# Patient Record
Sex: MALE | Race: WHITE | NOT HISPANIC OR LATINO | Employment: FULL TIME | URBAN - METROPOLITAN AREA
[De-identification: names, ages, dates, MRNs, and addresses within clinical notes are randomized per-mention and may not be internally consistent; named-entity substitution may affect disease eponyms.]

---

## 2023-08-11 ENCOUNTER — HOSPITAL ENCOUNTER (OUTPATIENT)
Dept: DATA CONVERSION | Facility: HOSPITAL | Age: 58
Discharge: HOME | End: 2023-08-11

## 2023-08-11 DIAGNOSIS — I10 ESSENTIAL (PRIMARY) HYPERTENSION: ICD-10-CM

## 2023-08-11 DIAGNOSIS — R53.81 OTHER MALAISE: ICD-10-CM

## 2023-08-11 DIAGNOSIS — M25.512 PAIN IN LEFT SHOULDER: ICD-10-CM

## 2023-08-11 DIAGNOSIS — R23.2 FLUSHING: ICD-10-CM

## 2023-08-11 LAB
ALBUMIN SERPL-MCNC: 4.6 GM/DL (ref 3.5–5)
ALBUMIN/GLOB SERPL: 1.6 RATIO (ref 1.5–3)
ALP BLD-CCNC: 63 U/L (ref 35–125)
ALT SERPL-CCNC: 29 U/L (ref 5–40)
ANION GAP SERPL CALCULATED.3IONS-SCNC: 14 MMOL/L (ref 0–19)
AST SERPL-CCNC: 23 U/L (ref 5–40)
BACTERIA UR QL AUTO: NEGATIVE
BASOPHILS # BLD AUTO: 0.01 K/UL (ref 0–0.22)
BASOPHILS NFR BLD AUTO: 0.1 % (ref 0–1)
BILIRUB SERPL-MCNC: 0.2 MG/DL (ref 0.1–1.2)
BILIRUB UR QL STRIP.AUTO: NEGATIVE
BUN SERPL-MCNC: 29 MG/DL (ref 8–25)
BUN/CREAT SERPL: 20.7 RATIO (ref 8–21)
CALCIUM SERPL-MCNC: 9.6 MG/DL (ref 8.5–10.4)
CHLORIDE SERPL-SCNC: 100 MMOL/L (ref 97–107)
CLARITY UR: CLEAR
CO2 SERPL-SCNC: 24 MMOL/L (ref 24–31)
COLOR UR: COLORLESS
CREAT SERPL-MCNC: 1.4 MG/DL (ref 0.4–1.6)
DEPRECATED RDW RBC AUTO: 43.6 FL (ref 37–54)
DIFFERENTIAL METHOD BLD: ABNORMAL
EOSINOPHIL # BLD AUTO: 0.07 K/UL (ref 0–0.45)
EOSINOPHIL NFR BLD: 0.8 % (ref 0–3)
ERYTHROCYTE [DISTWIDTH] IN BLOOD BY AUTOMATED COUNT: 13.2 % (ref 11.7–15)
GFR SERPL CREATININE-BSD FRML MDRD: 59 ML/MIN/1.73 M2
GLOBULIN SER-MCNC: 2.9 G/DL (ref 1.9–3.7)
GLUCOSE SERPL-MCNC: 197 MG/DL (ref 65–99)
GLUCOSE UR STRIP.AUTO-MCNC: 300 MG/DL
HCT VFR BLD AUTO: 43.8 % (ref 41–50)
HGB BLD-MCNC: 15.3 GM/DL (ref 13.5–16.5)
HGB UR QL STRIP.AUTO: ABNORMAL /HPF (ref 0–3)
HGB UR QL: ABNORMAL
HYALINE CASTS UR QL AUTO: ABNORMAL /LPF
IMM GRANULOCYTES # BLD AUTO: 0.06 K/UL (ref 0–0.1)
KETONES UR QL STRIP.AUTO: NEGATIVE
LEUKOCYTE ESTERASE UR QL STRIP.AUTO: NEGATIVE
LYMPHOCYTES # BLD AUTO: 1.86 K/UL (ref 1.2–3.2)
LYMPHOCYTES NFR BLD MANUAL: 20.4 % (ref 20–40)
MCH RBC QN AUTO: 31.5 PG (ref 26–34)
MCHC RBC AUTO-ENTMCNC: 34.9 % (ref 31–37)
MCV RBC AUTO: 90.3 FL (ref 80–100)
MICROSCOPIC (UA): ABNORMAL
MONOCYTES # BLD AUTO: 0.42 K/UL (ref 0–0.8)
MONOCYTES NFR BLD MANUAL: 4.6 % (ref 0–8)
NEUTROPHILS # BLD AUTO: 6.71 K/UL
NEUTROPHILS # BLD AUTO: 6.71 K/UL (ref 1.8–7.7)
NEUTROPHILS.IMMATURE NFR BLD: 0.7 % (ref 0–1)
NEUTS SEG NFR BLD: 73.4 % (ref 50–70)
NITRITE UR QL STRIP.AUTO: NEGATIVE
NRBC BLD-RTO: 0 /100 WBC
NT-PROBNP SERPL-MCNC: 228 PG/ML (ref 0–177)
PH UR STRIP.AUTO: 6 [PH] (ref 4.6–8)
PLATELET # BLD AUTO: 184 K/UL (ref 150–450)
PMV BLD AUTO: 10.7 CU (ref 7–12.6)
POTASSIUM SERPL-SCNC: 4.2 MMOL/L (ref 3.4–5.1)
PROT SERPL-MCNC: 7.5 G/DL (ref 5.9–7.9)
PROT UR STRIP.AUTO-MCNC: 200 MG/DL
RBC # BLD AUTO: 4.85 M/UL (ref 4.5–5.5)
SODIUM SERPL-SCNC: 138 MMOL/L (ref 133–145)
SP GR UR STRIP.AUTO: 1.01 (ref 1–1.03)
SQUAMOUS UR QL AUTO: ABNORMAL /HPF
URINE CULTURE: ABNORMAL
UROBILINOGEN UR QL STRIP.AUTO: NORMAL MG/DL (ref 0–1)
WBC # BLD AUTO: 9.1 K/UL (ref 4.5–11)
WBC #/AREA URNS AUTO: ABNORMAL /HPF (ref 0–3)

## 2023-11-02 PROBLEM — M17.11 PRIMARY OSTEOARTHRITIS OF RIGHT KNEE: Status: ACTIVE | Noted: 2023-11-02

## 2023-11-02 PROBLEM — I10 HYPERTENSIVE DISORDER: Status: ACTIVE | Noted: 2023-11-02

## 2023-11-02 PROBLEM — S83.209A TEAR OF MENISCUS, CURRENT: Status: ACTIVE | Noted: 2023-11-02

## 2023-11-02 RX ORDER — CARVEDILOL 12.5 MG/1
12.5 TABLET ORAL 2 TIMES DAILY
COMMUNITY
Start: 2023-10-10

## 2023-11-02 RX ORDER — FENOFIBRATE 160 MG/1
TABLET ORAL
COMMUNITY
Start: 2016-05-27

## 2023-11-02 RX ORDER — OXYCODONE AND ACETAMINOPHEN 5; 325 MG/1; MG/1
1 TABLET ORAL EVERY 6 HOURS PRN
COMMUNITY
Start: 2016-03-15

## 2023-11-02 RX ORDER — ONDANSETRON 4 MG/1
4 TABLET, ORALLY DISINTEGRATING ORAL EVERY 8 HOURS PRN
COMMUNITY
Start: 2020-06-23

## 2023-11-02 RX ORDER — MELOXICAM 15 MG/1
TABLET ORAL
COMMUNITY
Start: 2016-05-27

## 2023-11-02 RX ORDER — ALLOPURINOL 100 MG/1
200 TABLET ORAL
COMMUNITY
Start: 2016-06-06

## 2023-11-02 RX ORDER — LISINOPRIL 20 MG/1
20 TABLET ORAL 2 TIMES DAILY
COMMUNITY
Start: 2023-08-15

## 2023-11-02 RX ORDER — ATORVASTATIN CALCIUM 10 MG/1
1 TABLET, FILM COATED ORAL DAILY
COMMUNITY
Start: 2016-06-06

## 2023-11-02 RX ORDER — FENOFIBRATE 145 MG/1
145 TABLET, FILM COATED ORAL
COMMUNITY
Start: 2023-08-15

## 2023-11-02 RX ORDER — METFORMIN HYDROCHLORIDE 500 MG/1
500 TABLET ORAL 2 TIMES DAILY
COMMUNITY
Start: 2016-06-12

## 2023-11-02 RX ORDER — MOMETASONE FUROATE 50 UG/1
2 SPRAY, METERED NASAL NIGHTLY
COMMUNITY
Start: 2023-07-18

## 2023-11-02 RX ORDER — METHOCARBAMOL 500 MG/1
2 TABLET, FILM COATED ORAL NIGHTLY
COMMUNITY
Start: 2023-08-07

## 2023-11-02 RX ORDER — LISINOPRIL AND HYDROCHLOROTHIAZIDE 20; 25 MG/1; MG/1
TABLET ORAL
COMMUNITY
Start: 2016-06-19

## 2023-11-03 ENCOUNTER — OFFICE VISIT (OUTPATIENT)
Dept: ORTHOPEDIC SURGERY | Facility: HOSPITAL | Age: 58
End: 2023-11-03
Payer: COMMERCIAL

## 2023-11-03 DIAGNOSIS — M17.11 ARTHRITIS OF RIGHT KNEE: Primary | ICD-10-CM

## 2023-11-03 DIAGNOSIS — Z98.890 HISTORY OF ARTHROSCOPY OF RIGHT KNEE: ICD-10-CM

## 2023-11-03 PROCEDURE — 99213 OFFICE O/P EST LOW 20 MIN: CPT | Performed by: FAMILY MEDICINE

## 2023-11-03 PROCEDURE — G0463 HOSPITAL OUTPT CLINIC VISIT: HCPCS | Performed by: FAMILY MEDICINE

## 2023-11-03 NOTE — PROGRESS NOTES
** Please excuse any errors in grammar or translation related to this dictation. Voice recognition software was utilized to prepare this document. **    Assessment & Plan:  Patient follow-up for right knee pain secondary to arthritis.  Last series of Hyalgan injections provided approximately 6 months of relief.  Pain has been increasing as he is returned to more normal activities at work which require a lot of squatting and kneeling.  He would like to have another round of Hyalgan injections completed which I feel is appropriate for him at this time.  We will start the prior authorization process through his Work[employer]'s [employer]. for this.  Patient will follow-up once approved likely.  Can utilize OTC analgesics such as Tylenol or ibuprofen as well as application ice or heating pad to mitigate symptoms.  All questions answered and patient responded care.    Chief complaint:  Right knee pain, [facility]    HPI:  11/3/23: Patient reports the last Hyalgan series injection provided 5-6 months relief.  He did not return to full duty at work until August due to a shoulder surgery and noticed an increase of pain at that time.  Denies any new injuries.      1/31/23:Here for Hyalgan injection #5 of 5. No changes in health status since previous visit.      1/24/23:Here for Hyalgan injection #4 of 5. No changes in health status since previous visit.      1/17/23:Here for Hyalgan injection #3 of 5. No changes in health status since previous visit.      1/10/23:Here for Hyalgan injection #2 of 5. No changes in health status since previous visit. Started back to work this past week.      1/4/23: Here for Hyalgan injection #1 of 5. No changes in health status since previous visit. Plans to return to work following shoulder surgery next week.     12/20/22: 56 y/o M with chronic R knee pain. Initial injury occurred when unloading dairy pallet at grocery store in 2015. Pallet started to slide and attempted to hold onto it which caused knee to  twist and buckle. MRI demonstrated meniscal tear and had arthroscopy in 2015 by Dr. Gutierrez. Following surgery, continued to have ongoing pain. Has been managed with recurrent hyaluronic acid injections since 2017. These injections provided approximately 6 months of relief. Last had Hyalgan series completed in January 2022 by Dr. Kang. Delayed in having repeated due to recent L rotator cuff surgery.      Exam:  Right knee examined. No effusion, ecchymosis, or erythema.  AROM from 5 to 120 deg with 5/5 strength. SILT overlying knee. Motion crepitus present. Tenderness along medial joint line and medial femoral condyle.  No popliteal mass palpated. Negative anterior and posterior drawer.  No laxity to varus or valgus stress at 0 or 30 deg.  No patellar apprehension.

## 2023-12-15 ENCOUNTER — OFFICE VISIT (OUTPATIENT)
Dept: ORTHOPEDIC SURGERY | Facility: HOSPITAL | Age: 58
End: 2023-12-15
Payer: COMMERCIAL

## 2023-12-15 DIAGNOSIS — M17.11 ARTHRITIS OF RIGHT KNEE: Primary | ICD-10-CM

## 2023-12-15 PROCEDURE — 2500000004 HC RX 250 GENERAL PHARMACY W/ HCPCS (ALT 636 FOR OP/ED): Mod: JZ | Performed by: FAMILY MEDICINE

## 2023-12-15 PROCEDURE — 99213 OFFICE O/P EST LOW 20 MIN: CPT | Performed by: FAMILY MEDICINE

## 2023-12-15 PROCEDURE — 2500000005 HC RX 250 GENERAL PHARMACY W/O HCPCS: Performed by: FAMILY MEDICINE

## 2023-12-15 PROCEDURE — G0463 HOSPITAL OUTPT CLINIC VISIT: HCPCS | Performed by: FAMILY MEDICINE

## 2023-12-15 PROCEDURE — 20611 DRAIN/INJ JOINT/BURSA W/US: CPT | Performed by: FAMILY MEDICINE

## 2023-12-15 RX ORDER — LIDOCAINE HYDROCHLORIDE 10 MG/ML
2 INJECTION INFILTRATION; PERINEURAL
Status: COMPLETED | OUTPATIENT
Start: 2023-12-15 | End: 2023-12-15

## 2023-12-15 RX ORDER — DAPAGLIFLOZIN 10 MG/1
1 TABLET, FILM COATED ORAL
COMMUNITY
Start: 2023-11-10 | End: 2024-02-08

## 2023-12-15 RX ADMIN — Medication 20 MG: at 08:31

## 2023-12-15 RX ADMIN — LIDOCAINE HYDROCHLORIDE 2 ML: 10 INJECTION, SOLUTION INFILTRATION; PERINEURAL at 08:31

## 2023-12-15 ASSESSMENT — PAIN SCALES - GENERAL: PAINLEVEL_OUTOF10: 4

## 2023-12-15 ASSESSMENT — PAIN - FUNCTIONAL ASSESSMENT: PAIN_FUNCTIONAL_ASSESSMENT: 0-10

## 2023-12-15 NOTE — PROGRESS NOTES
** Please excuse any errors in grammar or translation related to this dictation. Voice recognition software was utilized to prepare this document. **    Assessment & Plan:  Patient here for ongoing management of right knee arthritis. Hyalgan injection completed in right knee as below. Will f/u in 1 week for next injection of series.  Can use otc analgesics (ibuprofen, naproxen, acetaminophen) as able and apply ice pack to control symptoms in interim. All questions answered and patient is agreeable to this plan.      Chief complaint:  Right knee pain, C    HPI:    12/15/23: Patient follow-up for continued nonoperative management of his chronic right knee pain due to arthritis.  Last hyaluronic acid injection was completed in January 2023 which provided 6 months relief.  No new injuries reported.  He was approved to receive repeat viscosupplement injections is here today to resume these.    11/3/23: Patient reports the last Hyalgan series injection provided 5-6 months relief.  He did not return to full duty at work until August due to a shoulder surgery and noticed an increase of pain at that time.  Denies any new injuries.      1/31/23:Here for Hyalgan injection #5 of 5. No changes in health status since previous visit.      1/24/23:Here for Hyalgan injection #4 of 5. No changes in health status since previous visit.      1/17/23:Here for Hyalgan injection #3 of 5. No changes in health status since previous visit.      1/10/23:Here for Hyalgan injection #2 of 5. No changes in health status since previous visit. Started back to work this past week.      1/4/23: Here for Hyalgan injection #1 of 5. No changes in health status since previous visit. Plans to return to work following shoulder surgery next week.     12/20/22: 58 y/o M with chronic R knee pain. Initial injury occurred when unloading dairy pallet at grocery store in 2015. Pallet started to slide and attempted to hold onto it which caused knee to twist and  jermaine. MRI demonstrated meniscal tear and had arthroscopy in 2015 by Dr. Gutierrez. Following surgery, continued to have ongoing pain. Has been managed with recurrent hyaluronic acid injections since 2017. These injections provided approximately 6 months of relief. Last had Hyalgan series completed in January 2022 by Dr. Kang. Delayed in having repeated due to recent L rotator cuff surgery.      Exam:  Right knee examined. No effusion, ecchymosis, or erythema.  AROM from 5 to 120 deg with 5/5 strength.     Patient ID: Anthony V Lanese is a 57 y.o. male.    L Inj/Asp: R knee on 12/15/2023 8:31 AM  Indications: pain  Details: 21 G needle, ultrasound-guided superolateral approach  Medications: 2 mL lidocaine 10 mg/mL (1 %); 20 mg sodium hyaluronate 10 mg/mL  Outcome: tolerated well, no immediate complications  Procedure, treatment alternatives, risks and benefits explained, specific risks discussed. Consent was given by the patient. Immediately prior to procedure a time out was called to verify the correct patient, procedure, equipment, support staff and site/side marked as required. Patient was prepped and draped in the usual sterile fashion.

## 2023-12-21 ENCOUNTER — OFFICE VISIT (OUTPATIENT)
Dept: ORTHOPEDIC SURGERY | Facility: HOSPITAL | Age: 58
End: 2023-12-21
Payer: COMMERCIAL

## 2023-12-21 DIAGNOSIS — M17.11 ARTHRITIS OF RIGHT KNEE: Primary | ICD-10-CM

## 2023-12-21 PROCEDURE — 2500000005 HC RX 250 GENERAL PHARMACY W/O HCPCS: Performed by: FAMILY MEDICINE

## 2023-12-21 PROCEDURE — 20611 DRAIN/INJ JOINT/BURSA W/US: CPT | Mod: RT | Performed by: FAMILY MEDICINE

## 2023-12-21 PROCEDURE — 20611 DRAIN/INJ JOINT/BURSA W/US: CPT | Performed by: FAMILY MEDICINE

## 2023-12-21 PROCEDURE — 2500000004 HC RX 250 GENERAL PHARMACY W/ HCPCS (ALT 636 FOR OP/ED): Mod: JZ | Performed by: FAMILY MEDICINE

## 2023-12-21 RX ORDER — LIDOCAINE HYDROCHLORIDE 10 MG/ML
2 INJECTION INFILTRATION; PERINEURAL
Status: COMPLETED | OUTPATIENT
Start: 2023-12-21 | End: 2023-12-21

## 2023-12-21 RX ADMIN — LIDOCAINE HYDROCHLORIDE 2 ML: 10 INJECTION, SOLUTION INFILTRATION; PERINEURAL at 15:59

## 2023-12-21 RX ADMIN — Medication 20 MG: at 15:59

## 2023-12-21 NOTE — PROGRESS NOTES
** Please excuse any errors in grammar or translation related to this dictation. Voice recognition software was utilized to prepare this document. **    Assessment & Plan:  Patient here for ongoing management of right knee arthritis. Hyalgan injection completed in right knee as below. Will f/u in 1 week for next injection of series.  Can use otc analgesics (ibuprofen, naproxen, acetaminophen) as able and apply ice pack to control symptoms in interim. All questions answered and patient is agreeable to this plan.    Chief complaint:  Right knee pain, C    HPI:  12/21/23: Patient here for Hyalgan injection #2 of 5. No changes in health status since previous visit.      12/15/23: Patient follow-up for continued nonoperative management of his chronic right knee pain due to arthritis.  Last hyaluronic acid injection was completed in January 2023 which provided 6 months relief.  No new injuries reported.  He was approved to receive repeat viscosupplement injections is here today to resume these.    11/3/23: Patient reports the last Hyalgan series injection provided 5-6 months relief.  He did not return to full duty at work until August due to a shoulder surgery and noticed an increase of pain at that time.  Denies any new injuries.       1/4/23-1/31/23: Hyalgan injections x 5 completed weekly.      12/20/22: 56 y/o M with chronic R knee pain. Initial injury occurred when unloading dairy pallet at grocery store in 2015. Pallet started to slide and attempted to hold onto it which caused knee to twist and buckle. MRI demonstrated meniscal tear and had arthroscopy in 2015 by Dr. Gutierrez. Following surgery, continued to have ongoing pain. Has been managed with recurrent hyaluronic acid injections since 2017. These injections provided approximately 6 months of relief. Last had Hyalgan series completed in January 2022 by Dr. Kang. Delayed in having repeated due to recent L rotator cuff surgery.      Exam:  Right knee  examined. No effusion, ecchymosis, or erythema.  AROM from 5 to 120 deg with 5/5 strength.     Patient ID: Anthony V Lanese is a 58 y.o. male.    L Inj/Asp: R knee on 12/21/2023 3:59 PM  Indications: pain  Details: 21 G needle, ultrasound-guided superolateral approach  Medications: 20 mg sodium hyaluronate 10 mg/mL; 2 mL lidocaine 10 mg/mL (1 %)  Outcome: tolerated well, no immediate complications  Procedure, treatment alternatives, risks and benefits explained, specific risks discussed. Consent was given by the patient. Immediately prior to procedure a time out was called to verify the correct patient, procedure, equipment, support staff and site/side marked as required. Patient was prepped and draped in the usual sterile fashion.

## 2023-12-29 ENCOUNTER — OFFICE VISIT (OUTPATIENT)
Dept: ORTHOPEDIC SURGERY | Facility: HOSPITAL | Age: 58
End: 2023-12-29
Payer: COMMERCIAL

## 2023-12-29 DIAGNOSIS — M17.11 ARTHRITIS OF RIGHT KNEE: Primary | ICD-10-CM

## 2023-12-29 PROCEDURE — 20611 DRAIN/INJ JOINT/BURSA W/US: CPT | Performed by: FAMILY MEDICINE

## 2023-12-29 PROCEDURE — 2500000005 HC RX 250 GENERAL PHARMACY W/O HCPCS: Performed by: FAMILY MEDICINE

## 2023-12-29 PROCEDURE — 20611 DRAIN/INJ JOINT/BURSA W/US: CPT | Mod: RT | Performed by: FAMILY MEDICINE

## 2023-12-29 PROCEDURE — 2500000004 HC RX 250 GENERAL PHARMACY W/ HCPCS (ALT 636 FOR OP/ED): Mod: JZ | Performed by: FAMILY MEDICINE

## 2023-12-29 RX ORDER — LIDOCAINE HYDROCHLORIDE 10 MG/ML
2 INJECTION INFILTRATION; PERINEURAL
Status: COMPLETED | OUTPATIENT
Start: 2023-12-29 | End: 2023-12-29

## 2023-12-29 RX ADMIN — LIDOCAINE HYDROCHLORIDE 2 ML: 10 INJECTION, SOLUTION INFILTRATION; PERINEURAL at 10:09

## 2023-12-29 RX ADMIN — Medication 20 MG: at 10:09

## 2023-12-29 NOTE — PROGRESS NOTES
** Please excuse any errors in grammar or translation related to this dictation. Voice recognition software was utilized to prepare this document. **    Assessment & Plan:  Patient here for ongoing management of right knee arthritis. Hyalgan injection completed in right knee as below. Will f/u in 1 week for next injection of series.  Can use otc analgesics (ibuprofen, naproxen, acetaminophen) as able and apply ice pack to control symptoms in interim. All questions answered and patient is agreeable to this plan.    Chief complaint:  Right knee pain, [facility]    HPI:  12/29/23:  Patient here for Hyalgan injection #3 of 5. No changes in health status since previous visit.     12/21/23: Patient here for Hyalgan injection #2 of 5. No changes in health status since previous visit.      12/15/23: Patient follow-up for continued nonoperative management of his chronic right knee pain due to arthritis.  Last hyaluronic acid injection was completed in January 2023 which provided 6 months relief.  No new injuries reported.  He was approved to receive repeat viscosupplement injections is here today to resume these.    11/3/23: Patient reports the last Hyalgan series injection provided 5-6 months relief.  He did not return to full duty at work until August due to a shoulder surgery and noticed an increase of pain at that time.  Denies any new injuries.       1/4/23-1/31/23: Hyalgan injections x 5 completed weekly.      12/20/22: 58 y/o M with chronic R knee pain. Initial injury occurred when unloading dairy pallet at grocery store in 2015. Pallet started to slide and attempted to hold onto it which caused knee to twist and buckle. MRI demonstrated meniscal tear and had arthroscopy in 2015 by Dr. Gutierrez. Following surgery, continued to have ongoing pain. Has been managed with recurrent hyaluronic acid injections since 2017. These injections provided approximately 6 months of relief. Last had Hyalgan series completed in January 2022 by  Dr. Kang. Delayed in having repeated due to recent L rotator cuff surgery.      Exam:  Right knee examined. No effusion, ecchymosis, or erythema.  AROM from 5 to 120 deg with 5/5 strength.     Patient ID: Anthony V Lanese is a 58 y.o. male.    L Inj/Asp: R knee on 12/29/2023 10:09 AM  Indications: pain  Details: 21 G needle, ultrasound-guided superolateral approach  Medications: 20 mg sodium hyaluronate 10 mg/mL; 2 mL lidocaine 10 mg/mL (1 %)  Outcome: tolerated well, no immediate complications  Procedure, treatment alternatives, risks and benefits explained, specific risks discussed. Consent was given by the patient. Immediately prior to procedure a time out was called to verify the correct patient, procedure, equipment, support staff and site/side marked as required. Patient was prepped and draped in the usual sterile fashion.

## 2024-01-05 ENCOUNTER — OFFICE VISIT (OUTPATIENT)
Dept: ORTHOPEDIC SURGERY | Facility: HOSPITAL | Age: 59
End: 2024-01-05
Payer: COMMERCIAL

## 2024-01-05 DIAGNOSIS — M17.11 ARTHRITIS OF RIGHT KNEE: Primary | ICD-10-CM

## 2024-01-05 PROCEDURE — 2500000005 HC RX 250 GENERAL PHARMACY W/O HCPCS: Performed by: FAMILY MEDICINE

## 2024-01-05 PROCEDURE — 2500000004 HC RX 250 GENERAL PHARMACY W/ HCPCS (ALT 636 FOR OP/ED): Mod: JZ | Performed by: FAMILY MEDICINE

## 2024-01-05 PROCEDURE — 20611 DRAIN/INJ JOINT/BURSA W/US: CPT | Performed by: FAMILY MEDICINE

## 2024-01-05 PROCEDURE — 20611 DRAIN/INJ JOINT/BURSA W/US: CPT | Mod: RT | Performed by: FAMILY MEDICINE

## 2024-01-05 RX ORDER — LIDOCAINE HYDROCHLORIDE 10 MG/ML
2 INJECTION INFILTRATION; PERINEURAL
Status: COMPLETED | OUTPATIENT
Start: 2024-01-05 | End: 2024-01-05

## 2024-01-05 RX ADMIN — LIDOCAINE HYDROCHLORIDE 2 ML: 10 INJECTION, SOLUTION INFILTRATION; PERINEURAL at 09:34

## 2024-01-05 RX ADMIN — Medication 20 MG: at 09:34

## 2024-01-05 NOTE — PROGRESS NOTES
** Please excuse any errors in grammar or translation related to this dictation. Voice recognition software was utilized to prepare this document. **    Assessment & Plan:  Patient here for ongoing management of right knee arthritis. Hyalgan injection completed in right knee as below. Will f/u in 1 week for next injection of series.  Can use otc analgesics (ibuprofen, naproxen, acetaminophen) as able and apply ice pack to control symptoms in interim. All questions answered and patient is agreeable to this plan.    Chief complaint:  Right knee pain, [facility]    HPI:  1/5/23:  Patient here for Hyalgan injection #4 of 5.  No changes in health status since previous visit.     12/29/23:  Patient here for Hyalgan injection #3 of 5. No changes in health status since previous visit.     12/21/23: Patient here for Hyalgan injection #2 of 5. No changes in health status since previous visit.      12/15/23: Patient follow-up for continued nonoperative management of his chronic right knee pain due to arthritis.  Last hyaluronic acid injection was completed in January 2023 which provided 6 months relief.  No new injuries reported.  He was approved to receive repeat viscosupplement injections is here today to resume these.    11/3/23: Patient reports the last Hyalgan series injection provided 5-6 months relief.  He did not return to full duty at work until August due to a shoulder surgery and noticed an increase of pain at that time.  Denies any new injuries.       1/4/23-1/31/23: Hyalgan injections x 5 completed weekly.      12/20/22: 56 y/o M with chronic R knee pain. Initial injury occurred when unloading dairy pallet at grocery store in 2015. Pallet started to slide and attempted to hold onto it which caused knee to twist and buckle. MRI demonstrated meniscal tear and had arthroscopy in 2015 by Dr. Gutierrez. Following surgery, continued to have ongoing pain. Has been managed with recurrent hyaluronic acid injections since 2017. These  injections provided approximately 6 months of relief. Last had Hyalgan series completed in January 2022 by Dr. Kang. Delayed in having repeated due to recent L rotator cuff surgery.      Exam:  Right knee examined. No effusion, ecchymosis, or erythema.  AROM from 5 to 120 deg with 5/5 strength.     Patient ID: Anthony V Lanese is a 58 y.o. male.    L Inj/Asp: R knee on 1/5/2024 9:34 AM  Indications: pain  Details: 21 G needle, ultrasound-guided  Medications: 20 mg sodium hyaluronate 10 mg/mL; 2 mL lidocaine 10 mg/mL (1 %)  Outcome: tolerated well, no immediate complications  Procedure, treatment alternatives, risks and benefits explained, specific risks discussed. Consent was given by the patient. Immediately prior to procedure a time out was called to verify the correct patient, procedure, equipment, support staff and site/side marked as required. Patient was prepped and draped in the usual sterile fashion.

## 2024-01-10 ENCOUNTER — OFFICE VISIT (OUTPATIENT)
Dept: ORTHOPEDIC SURGERY | Facility: CLINIC | Age: 59
End: 2024-01-10
Payer: COMMERCIAL

## 2024-01-10 DIAGNOSIS — M17.11 ARTHRITIS OF RIGHT KNEE: Primary | ICD-10-CM

## 2024-01-10 PROCEDURE — 20611 DRAIN/INJ JOINT/BURSA W/US: CPT | Performed by: FAMILY MEDICINE

## 2024-01-10 RX ORDER — LIDOCAINE HYDROCHLORIDE 20 MG/ML
2 INJECTION, SOLUTION INFILTRATION; PERINEURAL
Status: COMPLETED | OUTPATIENT
Start: 2024-01-10 | End: 2024-01-10

## 2024-01-10 RX ADMIN — LIDOCAINE HYDROCHLORIDE 2 ML: 20 INJECTION, SOLUTION INFILTRATION; PERINEURAL at 16:02

## 2024-01-10 NOTE — PROGRESS NOTES
** Please excuse any errors in grammar or translation related to this dictation. Voice recognition software was utilized to prepare this document. **    Assessment & Plan:  Patient here for ongoing management of Right knee arthritis. Hyalgan injection series completed today. Discussed with patient that it can take 2-3 weeks for pain relief to occur. This injection can be repeated again in 6 months if providing symptomatic relief. Patient asked to contact clinic in 5 months to start prior authorization process.  For intermittent pain can continue use of oral analgesics such as ibuprofen or acetaminophen, applying ice pack or heating pad.  If has increasing pain prior to then that is not controlled by oral analgesics, can f/u to have CSI completed.  All questions answered and patient is agreeable to this plan.       Chief complaint:  Right knee pain, [facility]    HPI:  1/10/24: Patient here for Hyalgan injection #5 of 5.  No changes in health status since previous visit.     1/5/23:  Patient here for Hyalgan injection #4 of 5.  No changes in health status since previous visit.     12/29/23:  Patient here for Hyalgan injection #3 of 5. No changes in health status since previous visit.     12/21/23: Patient here for Hyalgan injection #2 of 5. No changes in health status since previous visit.      12/15/23: Patient follow-up for continued nonoperative management of his chronic right knee pain due to arthritis.  Last hyaluronic acid injection was completed in January 2023 which provided 6 months relief.  No new injuries reported.  He was approved to receive repeat viscosupplement injections is here today to resume these.    11/3/23: Patient reports the last Hyalgan series injection provided 5-6 months relief.  He did not return to full duty at work until August due to a shoulder surgery and noticed an increase of pain at that time.  Denies any new injuries.       1/4/23-1/31/23: Hyalgan injections x 5 completed weekly.       12/20/22: 56 y/o M with chronic R knee pain. Initial injury occurred when unloading dairy pallet at grocery store in 2015. Pallet started to slide and attempted to hold onto it which caused knee to twist and buckle. MRI demonstrated meniscal tear and had arthroscopy in 2015 by Dr. Gutierrez. Following surgery, continued to have ongoing pain. Has been managed with recurrent hyaluronic acid injections since 2017. These injections provided approximately 6 months of relief. Last had Hyalgan series completed in January 2022 by Dr. Kang. Delayed in having repeated due to recent L rotator cuff surgery.      Exam:  Right knee examined. No effusion, ecchymosis, or erythema.  AROM from 5 to 120 deg with 5/5 strength.     Patient ID: Anthony V Lanese is a 58 y.o. male.    L Inj/Asp: R knee on 1/10/2024 4:02 PM  Indications: pain  Details: 21 G needle, ultrasound-guided superolateral approach  Medications: 20 mg sodium hyaluronate 10 mg/mL; 2 mL lidocaine 20 mg/mL (2 %)  Outcome: tolerated well, no immediate complications  Procedure, treatment alternatives, risks and benefits explained, specific risks discussed. Consent was given by the patient. Immediately prior to procedure a time out was called to verify the correct patient, procedure, equipment, support staff and site/side marked as required. Patient was prepped and draped in the usual sterile fashion.

## 2024-06-26 ENCOUNTER — APPOINTMENT (OUTPATIENT)
Dept: ORTHOPEDIC SURGERY | Facility: CLINIC | Age: 59
End: 2024-06-26
Payer: COMMERCIAL

## 2024-06-26 DIAGNOSIS — M17.11 ARTHRITIS OF RIGHT KNEE: Primary | ICD-10-CM

## 2024-06-26 NOTE — PROGRESS NOTES
** Please excuse any errors in grammar or translation related to this dictation. Voice recognition software was utilized to prepare this document. **    Assessment & Plan:  Patient following up for ongoing management of right knee arthritis.  Had positive response to Hyalgan injection series completed in December.  For his reasons recommend repeating hyaluronic acid injections which patient agrees to plan; will get the authorization process through [facility].  For the time being can utilize OTC oral analgesics, application of ice pack or heating pad.  All questions answered and patient agrees this plan of care.      Chief complaint:  Right knee pain, [facility]    HPI:  6/26/24:  Patient reports last gel series injections from 1/2023 provided about 5 months relief.  He denies any new injuries.  He has had pain with driving the past few weeks which is increasing.    1/10/24: Patient here for Hyalgan injection #5 of 5.  No changes in health status since previous visit.     1/5/23:  Patient here for Hyalgan injection #4 of 5.  No changes in health status since previous visit.     12/29/23:  Patient here for Hyalgan injection #3 of 5. No changes in health status since previous visit.     12/21/23: Patient here for Hyalgan injection #2 of 5. No changes in health status since previous visit.      12/15/23: Patient follow-up for continued nonoperative management of his chronic right knee pain due to arthritis.  Last hyaluronic acid injection was completed in January 2023 which provided 6 months relief.  No new injuries reported.  He was approved to receive repeat viscosupplement injections is here today to resume these.    11/3/23: Patient reports the last Hyalgan series injection provided 5-6 months relief.  He did not return to full duty at work until August due to a shoulder surgery and noticed an increase of pain at that time.  Denies any new injuries.       1/4/23-1/31/23: Hyalgan injections x 5 completed weekly.      12/20/22: 57  y/o M with chronic R knee pain. Initial injury occurred when unloading dairy pallet at grocery store in 2015. Pallet started to slide and attempted to hold onto it which caused knee to twist and buckle. MRI demonstrated meniscal tear and had arthroscopy in 2015 by Dr. Gutierrez. Following surgery, continued to have ongoing pain. Has been managed with recurrent hyaluronic acid injections since 2017. These injections provided approximately 6 months of relief. Last had Hyalgan series completed in January 2022 by Dr. Kang. Delayed in having repeated due to recent L rotator cuff surgery.      Exam:  Right knee examined. No effusion, ecchymosis, or erythema.  AROM from 5 to 120 deg with 5/5 strength. No laxity with valgus or varus stress. Negative anterior drawer.    Results:  1/9/21: Moderate right knee tricompartmental OA.

## 2024-08-06 ENCOUNTER — APPOINTMENT (OUTPATIENT)
Dept: ORTHOPEDIC SURGERY | Facility: CLINIC | Age: 59
End: 2024-08-06
Payer: COMMERCIAL

## 2024-08-06 ENCOUNTER — HOSPITAL ENCOUNTER (OUTPATIENT)
Dept: RADIOLOGY | Facility: EXTERNAL LOCATION | Age: 59
Discharge: HOME | End: 2024-08-06

## 2024-08-06 DIAGNOSIS — M17.11 ARTHRITIS OF RIGHT KNEE: Primary | ICD-10-CM

## 2024-08-06 PROCEDURE — 99213 OFFICE O/P EST LOW 20 MIN: CPT | Performed by: FAMILY MEDICINE

## 2024-08-06 PROCEDURE — 20611 DRAIN/INJ JOINT/BURSA W/US: CPT | Performed by: FAMILY MEDICINE

## 2024-08-06 RX ORDER — LIDOCAINE HYDROCHLORIDE 10 MG/ML
2 INJECTION INFILTRATION; PERINEURAL
Status: COMPLETED | OUTPATIENT
Start: 2024-08-06 | End: 2024-08-06

## 2024-08-06 NOTE — PROGRESS NOTES
** Please excuse any errors in grammar or translation related to this dictation. Voice recognition software was utilized to prepare this document. **    Assessment & Plan:  Patient here for ongoing management of right knee arthritis. Hyalgan injection completed in right knee as below. Will f/u in 1 week for next injection of series.  Can use otc analgesics (ibuprofen, naproxen, acetaminophen) as able and apply ice pack to control symptoms in interim. All questions answered and patient is agreeable to this plan.      Chief complaint:  Right knee pain, BWC    HPI:  8/6/24:  Patient following up for ongoing management of R knee OA.  He was last seen in June and it was discussed to repeat Hyalgan injection. No interval health hx changes from previous visit.     6/26/24:  Patient reports last gel series injections from 1/2023 provided about 5 months relief.  He denies any new injuries.  He has had pain with driving the past few weeks which is increasing.    1/10/24: Patient here for Hyalgan injection #5 of 5.  No changes in health status since previous visit.     1/5/23:  Patient here for Hyalgan injection #4 of 5.  No changes in health status since previous visit.     12/29/23:  Patient here for Hyalgan injection #3 of 5. No changes in health status since previous visit.     12/21/23: Patient here for Hyalgan injection #2 of 5. No changes in health status since previous visit.      12/15/23: Patient follow-up for continued nonoperative management of his chronic right knee pain due to arthritis.  Last hyaluronic acid injection was completed in January 2023 which provided 6 months relief.  No new injuries reported.  He was approved to receive repeat viscosupplement injections is here today to resume these.    11/3/23: Patient reports the last Hyalgan series injection provided 5-6 months relief.  He did not return to full duty at work until August due to a shoulder surgery and noticed an increase of pain at that time.   Denies any new injuries.       1/4/23-1/31/23: Hyalgan injections x 5 completed weekly.      12/20/22: 56 y/o M with chronic R knee pain. Initial injury occurred when unloading dairy pallet at grocery store in 2015. Pallet started to slide and attempted to hold onto it which caused knee to twist and buckle. MRI demonstrated meniscal tear and had arthroscopy in 2015 by Dr. Gutierrez. Following surgery, continued to have ongoing pain. Has been managed with recurrent hyaluronic acid injections since 2017. These injections provided approximately 6 months of relief. Last had Hyalgan series completed in January 2022 by Dr. Kang. Delayed in having repeated due to recent L rotator cuff surgery.      Exam:  Right knee examined. No effusion, ecchymosis, or erythema.  AROM from 5 to 120 deg with 5/5 strength. No laxity with valgus or varus stress. Negative anterior drawer.    Results:  1/9/21: Moderate right knee tricompartmental OA.    Procedure:  Patient ID: Anthony V Lanese is a 58 y.o. male.    L Inj/Asp: R knee on 8/6/2024 8:00 AM  Indications: pain  Details: 21 G needle, ultrasound-guided superolateral approach  Medications: 20 mg sodium hyaluronate 10 mg/mL; 2 mL lidocaine 10 mg/mL (1 %)  Outcome: tolerated well, no immediate complications    Procedure risk factors to include increased pain, bleeding, infection, neurovascular injury, soft tissue injury, transient elevation of blood glucose and blood pressure, and adverse reaction to medication were discussed with the patient. Patient understands there is a moderate risk of morbidity from undergoing the procedure.    Procedure, treatment alternatives, risks and benefits explained, specific risks discussed. Consent was given by the patient. Immediately prior to procedure a time out was called to verify the correct patient, procedure, equipment, support staff and site/side marked as required. Patient was prepped and draped in the usual sterile fashion.

## 2024-08-13 ENCOUNTER — HOSPITAL ENCOUNTER (OUTPATIENT)
Dept: RADIOLOGY | Facility: EXTERNAL LOCATION | Age: 59
Discharge: HOME | End: 2024-08-13

## 2024-08-13 ENCOUNTER — APPOINTMENT (OUTPATIENT)
Dept: ORTHOPEDIC SURGERY | Facility: CLINIC | Age: 59
End: 2024-08-13
Payer: COMMERCIAL

## 2024-08-13 DIAGNOSIS — M17.11 ARTHRITIS OF RIGHT KNEE: Primary | ICD-10-CM

## 2024-08-13 PROCEDURE — 20611 DRAIN/INJ JOINT/BURSA W/US: CPT | Performed by: FAMILY MEDICINE

## 2024-08-13 RX ORDER — LIDOCAINE HYDROCHLORIDE 10 MG/ML
2 INJECTION INFILTRATION; PERINEURAL
Status: COMPLETED | OUTPATIENT
Start: 2024-08-13 | End: 2024-08-13

## 2024-08-13 NOTE — PROGRESS NOTES
** Please excuse any errors in grammar or translation related to this dictation. Voice recognition software was utilized to prepare this document. **    Assessment & Plan:  Patient here for ongoing management of right knee arthritis. Hyalgan injection completed in right knee as below. Will f/u in 1 week for next injection of series.  Can use otc analgesics (ibuprofen, naproxen, acetaminophen) as able and apply ice pack to control symptoms in interim. All questions answered and patient is agreeable to this plan.      Chief complaint:  Right knee pain, BWC    HPI:  8/13/24: Patient presents for right knee Hyalgan #2 injection.      8/6/24:  Patient following up for ongoing management of R knee OA.  He was last seen in June and it was discussed to repeat Hyalgan injection. No interval health hx changes from previous visit.     6/26/24:  Patient reports last gel series injections from 1/2023 provided about 5 months relief.  He denies any new injuries.  He has had pain with driving the past few weeks which is increasing.    1/10/24: Patient here for Hyalgan injection #5 of 5.  No changes in health status since previous visit.     1/5/23:  Patient here for Hyalgan injection #4 of 5.  No changes in health status since previous visit.     12/29/23:  Patient here for Hyalgan injection #3 of 5. No changes in health status since previous visit.     12/21/23: Patient here for Hyalgan injection #2 of 5. No changes in health status since previous visit.      12/15/23: Patient follow-up for continued nonoperative management of his chronic right knee pain due to arthritis.  Last hyaluronic acid injection was completed in January 2023 which provided 6 months relief.  No new injuries reported.  He was approved to receive repeat viscosupplement injections is here today to resume these.    11/3/23: Patient reports the last Hyalgan series injection provided 5-6 months relief.  He did not return to full duty at work until August due to a  shoulder surgery and noticed an increase of pain at that time.  Denies any new injuries.       1/4/23-1/31/23: Hyalgan injections x 5 completed weekly.      12/20/22: 56 y/o M with chronic R knee pain. Initial injury occurred when unloading dairy pallet at grocery store in 2015. Pallet started to slide and attempted to hold onto it which caused knee to twist and buckle. MRI demonstrated meniscal tear and had arthroscopy in 2015 by Dr. Gutierrez. Following surgery, continued to have ongoing pain. Has been managed with recurrent hyaluronic acid injections since 2017. These injections provided approximately 6 months of relief. Last had Hyalgan series completed in January 2022 by Dr. Kang. Delayed in having repeated due to recent L rotator cuff surgery.      Exam:  Right knee examined. No effusion, ecchymosis, or erythema.  AROM from 5 to 120 deg with 5/5 strength. No laxity with valgus or varus stress. Negative anterior drawer.    Results:  1/9/21: Moderate right knee tricompartmental OA.    Procedure:  Patient ID: Anthony V Lanese is a 58 y.o. male.    L Inj/Asp: R knee on 8/13/2024 7:49 AM  Indications: pain  Details: 21 G needle, ultrasound-guided superolateral approach  Medications: 20 mg sodium hyaluronate 10 mg/mL; 2 mL lidocaine 10 mg/mL (1 %)  Outcome: tolerated well, no immediate complications  Procedure, treatment alternatives, risks and benefits explained, specific risks discussed. Consent was given by the patient. Immediately prior to procedure a time out was called to verify the correct patient, procedure, equipment, support staff and site/side marked as required. Patient was prepped and draped in the usual sterile fashion.

## 2024-08-20 ENCOUNTER — APPOINTMENT (OUTPATIENT)
Dept: ORTHOPEDIC SURGERY | Facility: CLINIC | Age: 59
End: 2024-08-20
Payer: COMMERCIAL

## 2024-08-20 ENCOUNTER — HOSPITAL ENCOUNTER (OUTPATIENT)
Dept: RADIOLOGY | Facility: EXTERNAL LOCATION | Age: 59
Discharge: HOME | End: 2024-08-20

## 2024-08-20 DIAGNOSIS — M17.11 ARTHRITIS OF RIGHT KNEE: ICD-10-CM

## 2024-08-20 PROCEDURE — 20611 DRAIN/INJ JOINT/BURSA W/US: CPT | Performed by: FAMILY MEDICINE

## 2024-08-20 PROCEDURE — 1036F TOBACCO NON-USER: CPT | Performed by: FAMILY MEDICINE

## 2024-08-20 RX ORDER — LIDOCAINE HYDROCHLORIDE 10 MG/ML
2 INJECTION INFILTRATION; PERINEURAL
Status: COMPLETED | OUTPATIENT
Start: 2024-08-20 | End: 2024-08-20

## 2024-08-20 NOTE — PROGRESS NOTES
** Please excuse any errors in grammar or translation related to this dictation. Voice recognition software was utilized to prepare this document. **    Assessment & Plan:  Patient here for ongoing management of right knee arthritis. Hyalgan injection completed in right knee as below. Will f/u in 1 week for next injection of series.  Can use otc analgesics (ibuprofen, naproxen, acetaminophen) as able and apply ice pack to control symptoms in interim. All questions answered and patient is agreeable to this plan.      Chief complaint:  Right knee pain, C    HPI:  8/20/24:  Patient presents for right knee Hyalgan #3 injection. Notes soreness following last injection which is typical response for him.     8/13/24: Patient presents for right knee Hyalgan #2 injection.      8/6/24:  Patient following up for ongoing management of R knee OA.  He was last seen in June and it was discussed to repeat Hyalgan injection. No interval health hx changes from previous visit.     6/26/24:  Patient reports last gel series injections from 1/2023 provided about 5 months relief.  He denies any new injuries.  He has had pain with driving the past few weeks which is increasing.    1/10/24: Patient here for Hyalgan injection #5 of 5.  No changes in health status since previous visit.     1/5/23:  Patient here for Hyalgan injection #4 of 5.  No changes in health status since previous visit.     12/29/23:  Patient here for Hyalgan injection #3 of 5. No changes in health status since previous visit.     12/21/23: Patient here for Hyalgan injection #2 of 5. No changes in health status since previous visit.      12/15/23: Patient follow-up for continued nonoperative management of his chronic right knee pain due to arthritis.  Last hyaluronic acid injection was completed in January 2023 which provided 6 months relief.  No new injuries reported.  He was approved to receive repeat viscosupplement injections is here today to resume  these.    11/3/23: Patient reports the last Hyalgan series injection provided 5-6 months relief.  He did not return to full duty at work until August due to a shoulder surgery and noticed an increase of pain at that time.  Denies any new injuries.       1/4/23-1/31/23: Hyalgan injections x 5 completed weekly.      12/20/22: 58 y/o M with chronic R knee pain. Initial injury occurred when unloading dairy pallet at grocery store in 2015. Pallet started to slide and attempted to hold onto it which caused knee to twist and buckle. MRI demonstrated meniscal tear and had arthroscopy in 2015 by Dr. Gutierrez. Following surgery, continued to have ongoing pain. Has been managed with recurrent hyaluronic acid injections since 2017. These injections provided approximately 6 months of relief. Last had Hyalgan series completed in January 2022 by Dr. Kang. Delayed in having repeated due to recent L rotator cuff surgery.      Exam:  Right knee examined. No effusion, ecchymosis, or erythema.  AROM from 5 to 120 deg with 5/5 strength    Results:  1/9/21: Moderate right knee tricompartmental OA.    Procedure:  Patient ID: Anthony V Lanese is a 58 y.o. male.    L Inj/Asp: R knee on 8/20/2024 8:13 AM  Indications: pain  Details: 21 G needle, ultrasound-guided superolateral approach  Medications: 20 mg sodium hyaluronate 10 mg/mL; 2 mL lidocaine 10 mg/mL (1 %)  Outcome: tolerated well, no immediate complications    Procedure risk factors to include increased pain, bleeding, infection, neurovascular injury, soft tissue injury, and adverse reaction to medication were discussed with the patient. Patient understands there is a moderate risk of morbidity from undergoing the procedure.    Procedure, treatment alternatives, risks and benefits explained, specific risks discussed. Consent was given by the patient. Immediately prior to procedure a time out was called to verify the correct patient, procedure, equipment, support staff and  site/side marked as required. Patient was prepped and draped in the usual sterile fashion.

## 2024-08-27 ENCOUNTER — HOSPITAL ENCOUNTER (OUTPATIENT)
Dept: RADIOLOGY | Facility: EXTERNAL LOCATION | Age: 59
Discharge: HOME | End: 2024-08-27

## 2024-08-27 ENCOUNTER — APPOINTMENT (OUTPATIENT)
Dept: ORTHOPEDIC SURGERY | Facility: CLINIC | Age: 59
End: 2024-08-27
Payer: COMMERCIAL

## 2024-08-27 DIAGNOSIS — M17.11 ARTHRITIS OF RIGHT KNEE: ICD-10-CM

## 2024-08-27 PROCEDURE — 20611 DRAIN/INJ JOINT/BURSA W/US: CPT | Performed by: FAMILY MEDICINE

## 2024-08-27 RX ORDER — LIDOCAINE HYDROCHLORIDE 10 MG/ML
2 INJECTION INFILTRATION; PERINEURAL
Status: COMPLETED | OUTPATIENT
Start: 2024-08-27 | End: 2024-08-27

## 2024-08-27 RX ADMIN — LIDOCAINE HYDROCHLORIDE 2 ML: 10 INJECTION INFILTRATION; PERINEURAL at 07:59

## 2024-08-27 NOTE — PROGRESS NOTES
** Please excuse any errors in grammar or translation related to this dictation. Voice recognition software was utilized to prepare this document. **    Assessment & Plan:  Patient here for ongoing management of right knee arthritis. Hyalgan injection completed in right knee as below. Will f/u in 1 week for final injection of series.  Can use otc analgesics (ibuprofen, naproxen, acetaminophen) as able and apply ice pack to control symptoms in interim. All questions answered and patient is agreeable to this plan.      Chief complaint:  Right knee pain, BWC    /HPI:  8/27/24: Patient presents for right knee Hyalgan #4.  Notes slight increase in pain after several hours spent in car yesterday.     8/20/24:  Patient presents for right knee Hyalgan #3 injection. Notes soreness following last injection which is typical response for him.     8/13/24: Patient presents for right knee Hyalgan #2 injection.      8/6/24:  Patient following up for ongoing management of R knee OA.  He was last seen in June and it was discussed to repeat Hyalgan injection. No interval health hx changes from previous visit.     6/26/24:  Patient reports last gel series injections from 1/2023 provided about 5 months relief.  He denies any new injuries.  He has had pain with driving the past few weeks which is increasing.    1/10/24: Patient here for Hyalgan injection #5 of 5.  No changes in health status since previous visit.     1/5/23:  Patient here for Hyalgan injection #4 of 5.  No changes in health status since previous visit.     12/29/23:  Patient here for Hyalgan injection #3 of 5. No changes in health status since previous visit.     12/21/23: Patient here for Hyalgan injection #2 of 5. No changes in health status since previous visit.      12/15/23: Patient follow-up for continued nonoperative management of his chronic right knee pain due to arthritis.  Last hyaluronic acid injection was completed in January 2023 which provided 6 months  relief.  No new injuries reported.  He was approved to receive repeat viscosupplement injections is here today to resume these.    11/3/23: Patient reports the last Hyalgan series injection provided 5-6 months relief.  He did not return to full duty at work until August due to a shoulder surgery and noticed an increase of pain at that time.  Denies any new injuries.       1/4/23-1/31/23: Hyalgan injections x 5 completed weekly.      12/20/22: 56 y/o M with chronic R knee pain. Initial injury occurred when unloading dairy pallet at grocery store in 2015. Pallet started to slide and attempted to hold onto it which caused knee to twist and buckle. MRI demonstrated meniscal tear and had arthroscopy in 2015 by Dr. Gutierrez. Following surgery, continued to have ongoing pain. Has been managed with recurrent hyaluronic acid injections since 2017. These injections provided approximately 6 months of relief. Last had Hyalgan series completed in January 2022 by Dr. Kang. Delayed in having repeated due to recent L rotator cuff surgery.      Exam:  Right knee examined. No effusion, ecchymosis, or erythema.  AROM from 5 to 120 deg with 5/5 strength    Results:  1/9/21: Moderate right knee tricompartmental OA.    Procedure:  Patient ID: Anthony V Lanese is a 58 y.o. male.    L Inj/Asp: R knee on 8/27/2024 7:59 AM  Indications: pain  Details: 21 G needle, ultrasound-guided superolateral approach  Medications: 2 mL sodium hyaluronate 10 mg/mL; 2 mL lidocaine 10 mg/mL (1 %)  Outcome: tolerated well, no immediate complications  Procedure, treatment alternatives, risks and benefits explained, specific risks discussed. Consent was given by the patient. Immediately prior to procedure a time out was called to verify the correct patient, procedure, equipment, support staff and site/side marked as required. Patient was prepped and draped in the usual sterile fashion.

## 2024-09-03 ENCOUNTER — APPOINTMENT (OUTPATIENT)
Dept: ORTHOPEDIC SURGERY | Facility: CLINIC | Age: 59
End: 2024-09-03
Payer: COMMERCIAL

## 2024-09-03 ENCOUNTER — HOSPITAL ENCOUNTER (OUTPATIENT)
Dept: RADIOLOGY | Facility: EXTERNAL LOCATION | Age: 59
Discharge: HOME | End: 2024-09-03

## 2024-09-03 DIAGNOSIS — M17.11 ARTHRITIS OF RIGHT KNEE: Primary | ICD-10-CM

## 2024-09-03 PROCEDURE — 20611 DRAIN/INJ JOINT/BURSA W/US: CPT | Performed by: FAMILY MEDICINE

## 2024-09-03 RX ORDER — LIDOCAINE HYDROCHLORIDE 10 MG/ML
2 INJECTION INFILTRATION; PERINEURAL
Status: COMPLETED | OUTPATIENT
Start: 2024-09-03 | End: 2024-09-03

## 2024-09-03 NOTE — PROGRESS NOTES
** Please excuse any errors in grammar or translation related to this dictation. Voice recognition software was utilized to prepare this document. **    Assessment & Plan:  Patient here for ongoing management of right knee arthritis. Hyalgan injection series completed in right knee as below. This can be repeated again in 6+ months as symptoms dictate. Can use otc analgesics (ibuprofen, naproxen, acetaminophen) as able and apply ice pack to control symptoms in interim. All questions answered and patient is agreeable to this plan.      Chief complaint:  Right knee pain, [facility]    HPI:  9/3/24:  Patient presents for right knee Hyalgan #5.      8/27/24: Patient presents for right knee Hyalgan #4.  Notes slight increase in pain after several hours spent in car yesterday.     8/20/24:  Patient presents for right knee Hyalgan #3 injection. Notes soreness following last injection which is typical response for him.     8/13/24: Patient presents for right knee Hyalgan #2 injection.      8/6/24:  Patient following up for ongoing management of R knee OA.  He was last seen in June and it was discussed to repeat Hyalgan injection. No interval health hx changes from previous visit.     6/26/24:  Patient reports last gel series injections from 1/2023 provided about 5 months relief.  He denies any new injuries.  He has had pain with driving the past few weeks which is increasing.    1/10/24: Patient here for Hyalgan injection #5 of 5.  No changes in health status since previous visit.     1/5/23:  Patient here for Hyalgan injection #4 of 5.  No changes in health status since previous visit.     12/29/23:  Patient here for Hyalgan injection #3 of 5. No changes in health status since previous visit.     12/21/23: Patient here for Hyalgan injection #2 of 5. No changes in health status since previous visit.      12/15/23: Patient follow-up for continued nonoperative management of his chronic right knee pain due to arthritis.  Last  hyaluronic acid injection was completed in January 2023 which provided 6 months relief.  No new injuries reported.  He was approved to receive repeat viscosupplement injections is here today to resume these.    11/3/23: Patient reports the last Hyalgan series injection provided 5-6 months relief.  He did not return to full duty at work until August due to a shoulder surgery and noticed an increase of pain at that time.  Denies any new injuries.       1/4/23-1/31/23: Hyalgan injections x 5 completed weekly.      12/20/22: 56 y/o M with chronic R knee pain. Initial injury occurred when unloading dairy pallet at grocery store in 2015. Pallet started to slide and attempted to hold onto it which caused knee to twist and buckle. MRI demonstrated meniscal tear and had arthroscopy in 2015 by Dr. Gutierrez. Following surgery, continued to have ongoing pain. Has been managed with recurrent hyaluronic acid injections since 2017. These injections provided approximately 6 months of relief. Last had Hyalgan series completed in January 2022 by Dr. Kang. Delayed in having repeated due to recent L rotator cuff surgery.      Exam:  Right knee examined. No effusion, ecchymosis, or erythema.  AROM from 5 to 120 deg with 5/5 strength    Results:  1/9/21: Moderate right knee tricompartmental OA.    Procedure:  Patient ID: Anthony V Lanese is a 58 y.o. male.    L Inj/Asp: R knee on 9/3/2024 7:57 AM  Indications: pain  Details: 21 G needle, ultrasound-guided superolateral approach  Medications: 20 mg sodium hyaluronate 10 mg/mL; 2 mL lidocaine 10 mg/mL (1 %)  Outcome: tolerated well, no immediate complications  Procedure, treatment alternatives, risks and benefits explained, specific risks discussed. Consent was given by the patient. Immediately prior to procedure a time out was called to verify the correct patient, procedure, equipment, support staff and site/side marked as required. Patient was prepped and draped in the usual sterile  fashion.

## 2025-02-05 ENCOUNTER — APPOINTMENT (OUTPATIENT)
Dept: ORTHOPEDIC SURGERY | Facility: CLINIC | Age: 60
End: 2025-02-05
Payer: COMMERCIAL

## 2025-02-12 ENCOUNTER — APPOINTMENT (OUTPATIENT)
Dept: ORTHOPEDIC SURGERY | Facility: CLINIC | Age: 60
End: 2025-02-12
Payer: COMMERCIAL

## 2025-02-12 DIAGNOSIS — M17.11 ARTHRITIS OF RIGHT KNEE: Primary | ICD-10-CM

## 2025-02-12 NOTE — PROGRESS NOTES
** Please excuse any errors in grammar or translation related to this dictation. Voice recognition software was utilized to prepare this document. **    Assessment & Plan:  Patient here for ongoing management of right arthritis.  Unfortunately most recent round of Hyalgan injections only provided around  4 months relief; previously helped for 5-6+ months.  Overall he feels his pain is worsening along with increased stiffness.  Non-operative treatment options reviewed below.  - Maintaining a healthy weight: Every pound of bodyweight is about 4-5 pounds through the lower extremity.   - Exercise program to strengthen supportive musculature.    - Activity modifications as needed to include use of cane/walker or braces.  - Analgesics to include but not limited to Tylenol up to 3g daily; topical diclofenac gel (Voltaren) up to 4x/daily.  - Steroid injection.  Received previously and felt hyaluronic acid injections for more beneficial.  - Hyaluronic acid injection.  Discussed with patient option to switch from Hyalgan to either a 3-shot or single shot hyaluronic acid injection.  Patient agrees to 3-shot series and we will start prior authorization with [facility] for Euflexxa.    Since has been 4 years since his last knee x-rays, we will request through [facility] to had updated x-rays completed to assess for radiologic progression.     Patient will follow-up once hyaluronic acid injection approved for completion.    Chief complaint:  Right knee pain, [facility]    HPI:  2/12/25:  Patient reports last Hyalgan series in 9/2024 provided him good relief for about 4 months. Pain has progressively been increasing since last month and feels overall this is the worst pain has been.  Denies any new injuries.  Pain is at bilateral joint line.  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 Notes increased stiffness.     9/3/24:  Patient presents for right knee Hyalgan #5.      8/27/24: Patient presents for right knee Hyalgan #4.  Notes slight  increase in pain after several hours spent in car yesterday.     8/20/24:  Patient presents for right knee Hyalgan #3 injection. Notes soreness following last injection which is typical response for him.     8/13/24: Patient presents for right knee Hyalgan #2 injection.      8/6/24:  Patient following up for ongoing management of R knee OA.  He was last seen in June and it was discussed to repeat Hyalgan injection. No interval health hx changes from previous visit.     6/26/24:  Patient reports last gel series injections from 1/2023 provided about 5 months relief.  He denies any new injuries.  He has had pain with driving the past few weeks which is increasing.    1/10/24: Patient here for Hyalgan injection #5 of 5.  No changes in health status since previous visit.     1/5/23:  Patient here for Hyalgan injection #4 of 5.  No changes in health status since previous visit.     12/29/23:  Patient here for Hyalgan injection #3 of 5. No changes in health status since previous visit.     12/21/23: Patient here for Hyalgan injection #2 of 5. No changes in health status since previous visit.      12/15/23: Patient follow-up for continued nonoperative management of his chronic right knee pain due to arthritis.  Last hyaluronic acid injection was completed in January 2023 which provided 6 months relief.  No new injuries reported.  He was approved to receive repeat viscosupplement injections is here today to resume these.    11/3/23: Patient reports the last Hyalgan series injection provided 5-6 months relief.  He did not return to full duty at work until August due to a shoulder surgery and noticed an increase of pain at that time.  Denies any new injuries.       1/4/23-1/31/23: Hyalgan injections x 5 completed weekly.      12/20/22: 58 y/o M with chronic R knee pain. Initial injury occurred when unloading dairy pallet at grocery store in 2015. Pallet started to slide and attempted to hold onto it which caused knee to twist  and buckle. MRI demonstrated meniscal tear and had arthroscopy in 2015 by Dr. Gutierrez. Following surgery, continued to have ongoing pain. Has been managed with recurrent hyaluronic acid injections since 2017. These injections provided approximately 6 months of relief. Last had Hyalgan series completed in January 2022 by Dr. Kang. Delayed in having repeated due to recent L rotator cuff surgery.      Exam:  General Exam:  Constitutional - NAD, AAO x 3, conversing appropriately.  HEENT- Normocephalic and atraumatic. No facial deformities. Hearing grossly normal.  Lungs - Breathing non-labored with normal rate. No accessory muscle use.  CV - Extremities warm and well-perfused, brisk capillary refill present.   Neuro - CN II-XII grossly intact.    Right knee examined. No effusion, ecchymosis, or erythema.  AROM from 5 to 120 deg with 5/5 strength. Retropatellar crepitus. TTP at medial > lateral joint line.  - patellar apprehension.  No laxity with anterior or posterior drawer. No laxity with valgus or varus stress at 30deg.     Results:  1/9/21: Moderate right knee tricompartmental OA.

## 2025-03-05 ENCOUNTER — APPOINTMENT (OUTPATIENT)
Dept: ORTHOPEDIC SURGERY | Facility: CLINIC | Age: 60
End: 2025-03-05
Payer: COMMERCIAL

## 2025-03-05 ENCOUNTER — HOSPITAL ENCOUNTER (OUTPATIENT)
Dept: RADIOLOGY | Facility: CLINIC | Age: 60
Discharge: HOME | End: 2025-03-05
Payer: COMMERCIAL

## 2025-03-05 ENCOUNTER — HOSPITAL ENCOUNTER (OUTPATIENT)
Dept: RADIOLOGY | Facility: EXTERNAL LOCATION | Age: 60
Discharge: HOME | End: 2025-03-05

## 2025-03-05 DIAGNOSIS — M17.11 ARTHRITIS OF RIGHT KNEE: ICD-10-CM

## 2025-03-05 PROCEDURE — 20611 DRAIN/INJ JOINT/BURSA W/US: CPT | Performed by: FAMILY MEDICINE

## 2025-03-05 PROCEDURE — 73564 X-RAY EXAM KNEE 4 OR MORE: CPT | Mod: RT

## 2025-03-05 NOTE — PROGRESS NOTES
** Please excuse any errors in grammar or translation related to this dictation. Voice recognition software was utilized to prepare this document. **    Assessment & Plan:  Patient here for ongoing management of right arthritis.  Overall he feels his pain is worsening along with increased stiffness. Euflexxa injection series started today. Previously had been completing Hyalgan q6 months.   Non-operative treatment options reviewed below.  - Maintaining a healthy weight: Every pound of bodyweight is about 4-5 pounds through the lower extremity.   - Exercise program to strengthen supportive musculature.    - Activity modifications as needed to include use of cane/walker or braces.  - Analgesics to include but not limited to Tylenol up to 3g daily; topical diclofenac gel (Voltaren) up to 4x/daily.  - Steroid injection.  Received previously and felt hyaluronic acid injections for more beneficial.  - Hyaluronic acid injection.  Euflexxa injection completed in right knee as below. Will f/u in 1 week for next injection of series.  Can use otc analgesics (ibuprofen, naproxen, acetaminophen) as able and apply ice pack to control symptoms in interim. All questions answered and patient is agreeable to this plan.      Chief complaint:  Right knee pain, [facility]    HPI:  3/5/25: Patient presents for right knee Euflexxa #1.     2/12/25:  Patient reports last Hyalgan series in 9/2024 provided him good relief for about 4 months. Pain has progressively been increasing since last month and feels overall this is the worst pain has been.  Denies any new injuries.  Pain is at bilateral joint line.  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 Notes increased stiffness.       12/20/22: 58 y/o M with chronic R knee pain. Initial injury occurred when unloading dairy pallet at grocery store in 2015. Pallet started to slide and attempted to hold onto it which caused knee to twist and buckle. MRI demonstrated meniscal tear and had  arthroscopy in 2015 by Dr. Gutierrez. Following surgery, continued to have ongoing pain. Has been managed with recurrent hyaluronic acid injections since 2017. These injections provided approximately 6 months of relief. Last had Hyalgan series completed in January 2022 by Dr. Kang. Delayed in having repeated due to recent L rotator cuff surgery.      Exam:  General Exam:  Constitutional - NAD, AAO x 3, conversing appropriately.  HEENT- Normocephalic and atraumatic. No facial deformities. Hearing grossly normal.  Lungs - Breathing non-labored with normal rate. No accessory muscle use.  CV - Extremities warm and well-perfused, brisk capillary refill present.   Neuro - CN II-XII grossly intact.    Right knee examined. No effusion, ecchymosis, or erythema.  AROM from 5 to 120 deg with 5/5 strength. Retropatellar crepitus. TTP at medial > lateral joint line.  - patellar apprehension.  No laxity with anterior or posterior drawer. No laxity with valgus or varus stress at 30deg.     Results:  Xrays of right knee obtained 3/5/25 personally interpreted as moderate degenerative changes with joint space narrowing, osteophyte formation, and subchondral sclerosis. Stable appearance compared to prior images 2021.     MRI of left knee 10/2021: Horizontal tear of medial meniscus posterior horn and body. Horizontal tear of lateral meniscus posterior horn and body.    Procedure:   Patient ID: Anthony V Lanese is a 59 y.o. male.    L Inj/Asp: R knee on 3/5/2025 8:23 AM  Indications: pain  Details: 21 G needle, ultrasound-guided superolateral approach  Medications: 20 mg sodium hyaluronate 10 mg/mL(mw 2.4 -3.6 million)  Outcome: tolerated well, no immediate complications    Procedure risk factors to include increased pain, bleeding, infection, neurovascular injury, soft tissue injury, progressive cartilage loss, transient elevation of blood glucose and blood pressure, and adverse reaction to medication were discussed with the  patient. Patient understands there is a moderate risk of morbidity from undergoing the procedure.    Procedure, treatment alternatives, risks and benefits explained, specific risks discussed. Consent was given by the patient. Immediately prior to procedure a time out was called to verify the correct patient, procedure, equipment, support staff and site/side marked as required. Patient was prepped and draped in the usual sterile fashion.

## 2025-03-12 ENCOUNTER — APPOINTMENT (OUTPATIENT)
Dept: ORTHOPEDIC SURGERY | Facility: CLINIC | Age: 60
End: 2025-03-12
Payer: COMMERCIAL

## 2025-03-12 ENCOUNTER — HOSPITAL ENCOUNTER (OUTPATIENT)
Dept: RADIOLOGY | Facility: EXTERNAL LOCATION | Age: 60
Discharge: HOME | End: 2025-03-12

## 2025-03-12 DIAGNOSIS — M17.11 ARTHRITIS OF RIGHT KNEE: ICD-10-CM

## 2025-03-12 PROCEDURE — 20611 DRAIN/INJ JOINT/BURSA W/US: CPT | Performed by: FAMILY MEDICINE

## 2025-03-12 NOTE — PROGRESS NOTES
** Please excuse any errors in grammar or translation related to this dictation. Voice recognition software was utilized to prepare this document. **    Assessment & Plan:  Patient here for ongoing management of right arthritis.  Euflexxa injection series continued today.   Non-operative treatment options reviewed below.  - Maintaining a healthy weight: Every pound of bodyweight is about 4-5 pounds through the lower extremity.   - Exercise program to strengthen supportive musculature.    - Analgesics to include but not limited to Tylenol up to 3g daily; topical diclofenac gel (Voltaren) up to 4x/daily.  - Steroid injection.  Received previously and felt hyaluronic acid injections for more beneficial. Consider to repeat depending on results of Euflexxa series as it has been years since last CSI.   - Hyaluronic acid injection.  Euflexxa injection completed in right knee as below. Will f/u in 1 week for next injection of series.  Can use otc analgesics (ibuprofen, naproxen, acetaminophen) as able and apply ice pack to control symptoms in interim. All questions answered and patient is agreeable to this plan.      Chief complaint:  Right knee pain, [facility]    HPI:  3/12/25: Patient presents for right knee Euflexxa #2.  3/5/25: Patient presents for right knee Euflexxa #1.     2/12/25:  Patient reports last Hyalgan series in 9/2024 provided him good relief for about 4 months. Pain has progressively been increasing since last month and feels overall this is the worst pain has been.  Denies any new injuries.  Pain is at bilateral joint line.  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 Notes increased stiffness.       Initial visit 12/20/22: 58 y/o M with chronic R knee pain. Initial injury occurred when unloading dairy pallet at grocery store in 2015. Pallet started to slide and attempted to hold onto it which caused knee to twist and buckle. MRI demonstrated meniscal tear and had arthroscopy in 2015 by Dr. Gutierrez.  Following surgery, continued to have ongoing pain. Has been managed with recurrent hyaluronic acid injections since 2017. These injections provided approximately 6 months of relief. Last had Hyalgan series completed in January 2022 by Dr. Kang. Delayed in having repeated due to recent L rotator cuff surgery.      Exam:  General Exam:  Constitutional - NAD, AAO x 3, conversing appropriately.  HEENT- Normocephalic and atraumatic. No facial deformities. Hearing grossly normal.  Lungs - Breathing non-labored with normal rate. No accessory muscle use.  CV - Extremities warm and well-perfused, brisk capillary refill present.   Neuro - CN II-XII grossly intact.    Right knee examined. No effusion, ecchymosis, or erythema.  AROM from 5 to 120 deg with 5/5 strength. Retropatellar crepitus. TTP at medial > lateral joint line.  - patellar apprehension.  No laxity with anterior or posterior drawer. No laxity with valgus or varus stress at 30deg.     Results:  Xrays of right knee obtained 3/5/25 personally interpreted as moderate degenerative changes with joint space narrowing, osteophyte formation, and subchondral sclerosis. Stable appearance compared to prior images 2021.     MRI of right knee knee 10/2021: Horizontal tear of medial meniscus posterior horn and body. Horizontal tear of lateral meniscus posterior horn and body. Medial compartment articular cartilage loss.    Procedure:   Patient ID: Anthony V Lanese is a 59 y.o. male.    L Inj/Asp: R knee on 3/12/2025 7:53 AM  Indications: pain  Details: 21 G needle, ultrasound-guided superolateral approach  Medications: 20 mg sodium hyaluronate 10 mg/mL(mw 2.4 -3.6 million)  Outcome: tolerated well, no immediate complications    Procedure risk factors to include increased pain, bleeding, infection, neurovascular injury, soft tissue injury, and adverse reaction to medication were discussed with the patient. Patient understands there is a moderate risk of morbidity from  undergoing the procedure.    Procedure, treatment alternatives, risks and benefits explained, specific risks discussed. Consent was given by the patient. Immediately prior to procedure a time out was called to verify the correct patient, procedure, equipment, support staff and site/side marked as required. Patient was prepped and draped in the usual sterile fashion.

## 2025-03-19 ENCOUNTER — HOSPITAL ENCOUNTER (OUTPATIENT)
Dept: RADIOLOGY | Facility: EXTERNAL LOCATION | Age: 60
Discharge: HOME | End: 2025-03-19

## 2025-03-19 ENCOUNTER — APPOINTMENT (OUTPATIENT)
Dept: ORTHOPEDIC SURGERY | Facility: CLINIC | Age: 60
End: 2025-03-19
Payer: COMMERCIAL

## 2025-03-19 DIAGNOSIS — M17.11 ARTHRITIS OF RIGHT KNEE: ICD-10-CM

## 2025-03-19 PROCEDURE — 20611 DRAIN/INJ JOINT/BURSA W/US: CPT | Performed by: FAMILY MEDICINE
